# Patient Record
Sex: FEMALE | URBAN - METROPOLITAN AREA
[De-identification: names, ages, dates, MRNs, and addresses within clinical notes are randomized per-mention and may not be internally consistent; named-entity substitution may affect disease eponyms.]

---

## 2021-11-15 ENCOUNTER — OFFICE VISIT (OUTPATIENT)
Dept: URBAN - METROPOLITAN AREA CLINIC 96 | Facility: CLINIC | Age: 61
End: 2021-11-15
Payer: COMMERCIAL

## 2021-11-15 ENCOUNTER — WEB ENCOUNTER (OUTPATIENT)
Dept: URBAN - METROPOLITAN AREA CLINIC 96 | Facility: CLINIC | Age: 61
End: 2021-11-15

## 2021-11-15 DIAGNOSIS — Z86.010 PERSONAL HISTORY OF COLONIC POLYPS: ICD-10-CM

## 2021-11-15 DIAGNOSIS — Z83.79 FAMILY HISTORY OF GALLBLADDER DISEASE: ICD-10-CM

## 2021-11-15 DIAGNOSIS — D12.6 TUBULAR ADENOMA OF COLON: ICD-10-CM

## 2021-11-15 DIAGNOSIS — Z79.1 NSAID LONG-TERM USE: ICD-10-CM

## 2021-11-15 DIAGNOSIS — R10.13 EPIGASTRIC ABDOMINAL PAIN: ICD-10-CM

## 2021-11-15 DIAGNOSIS — R14.0 BLOATING: ICD-10-CM

## 2021-11-15 DIAGNOSIS — E66.3 OVERWEIGHT (BMI 25.0-29.9): ICD-10-CM

## 2021-11-15 PROCEDURE — 99204 OFFICE O/P NEW MOD 45 MIN: CPT | Performed by: INTERNAL MEDICINE

## 2021-11-15 PROCEDURE — 99244 OFF/OP CNSLTJ NEW/EST MOD 40: CPT | Performed by: INTERNAL MEDICINE

## 2021-11-15 RX ORDER — POLYETHYLENE GLYCOL 3350, SODIUM SULFATE, SODIUM CHLORIDE, POTASSIUM CHLORIDE, ASCORBIC ACID, SODIUM ASCORBATE 140-9-5.2G
AS DIRECTED KIT ORAL
Qty: 1 BOX | Refills: 1 | OUTPATIENT

## 2021-11-15 NOTE — HPI-TODAY'S VISIT:
The patient was referred by Dr. Bartolome Anand for abd pain.   A copy of this document is being forwarded to the referring provider. 61 y.o. female, lives w/ , 3 children, 10 grandchildren, at home on disability When made appt 6 weeks ago, had sharp pains below breast Couldn't pass gas or burp Very uncomfortable Bloated Used Debbie Whitewater and baking powder to relieve it Would last all day Couldn't eat anything  Not sure what happened Everything is better now Have been on pantoprazole for a while and was on it when had the syx

## 2021-11-17 LAB
A/G RATIO: 1.7
ALBUMIN: 4.5
ALKALINE PHOSPHATASE: 77
ALT (SGPT): 19
AST (SGOT): 18
BASO (ABSOLUTE): 0
BASOS: 1
BILIRUBIN, TOTAL: 0.4
BUN/CREATININE RATIO: 19
BUN: 18
CALCIUM: 10.2
CARBON DIOXIDE, TOTAL: 23
CHLORIDE: 100
CREATININE: 0.94
EGFR IF AFRICN AM: 77
EGFR IF NONAFRICN AM: 67
ENDOMYSIAL ANTIBODY IGA: NEGATIVE
EOS (ABSOLUTE): 0.1
EOS: 3
GLOBULIN, TOTAL: 2.6
GLUCOSE: 93
HEMATOCRIT: 35.9
HEMATOLOGY COMMENTS:: (no result)
HEMOGLOBIN: 11.8
IMMATURE CELLS: (no result)
IMMATURE GRANS (ABS): 0
IMMATURE GRANULOCYTES: 0
IMMUNOGLOBULIN A, QN, SERUM: 347
LIPASE: 40
LYMPHS (ABSOLUTE): 1.2
LYMPHS: 28
MCH: 28.3
MCHC: 32.9
MCV: 86
MONOCYTES(ABSOLUTE): 0.5
MONOCYTES: 11
NEUTROPHILS (ABSOLUTE): 2.5
NEUTROPHILS: 57
NRBC: (no result)
PLATELETS: 297
POTASSIUM: 4.2
PROTEIN, TOTAL: 7.1
RBC: 4.17
RDW: 13.1
SODIUM: 140
T-TRANSGLUTAMINASE (TTG) IGA: <2
WBC: 4.3

## 2021-11-22 ENCOUNTER — DASHBOARD ENCOUNTERS (OUTPATIENT)
Age: 61
End: 2021-11-22

## 2022-01-25 ENCOUNTER — OFFICE VISIT (OUTPATIENT)
Dept: URBAN - METROPOLITAN AREA SURGERY CENTER 18 | Facility: SURGERY CENTER | Age: 62
End: 2022-01-25

## 2022-03-14 PROBLEM — 428283002: Status: ACTIVE | Noted: 2021-11-15

## 2022-03-28 ENCOUNTER — CLAIMS CREATED FROM THE CLAIM WINDOW (OUTPATIENT)
Dept: URBAN - METROPOLITAN AREA CLINIC 4 | Facility: CLINIC | Age: 62
End: 2022-03-28
Payer: COMMERCIAL

## 2022-03-28 ENCOUNTER — OFFICE VISIT (OUTPATIENT)
Dept: URBAN - METROPOLITAN AREA SURGERY CENTER 18 | Facility: SURGERY CENTER | Age: 62
End: 2022-03-28
Payer: COMMERCIAL

## 2022-03-28 DIAGNOSIS — K63.5 BENIGN COLON POLYP: ICD-10-CM

## 2022-03-28 DIAGNOSIS — Z86.010 ADENOMAS PERSONAL HISTORY OF COLONIC POLYPS: ICD-10-CM

## 2022-03-28 DIAGNOSIS — K63.89 CYST OF DUODENUM: ICD-10-CM

## 2022-03-28 PROCEDURE — 88305 TISSUE EXAM BY PATHOLOGIST: CPT | Performed by: PATHOLOGY

## 2022-03-28 PROCEDURE — G8907 PT DOC NO EVENTS ON DISCHARG: HCPCS | Performed by: INTERNAL MEDICINE

## 2022-03-28 PROCEDURE — 45380 COLONOSCOPY AND BIOPSY: CPT | Performed by: INTERNAL MEDICINE

## 2022-03-28 RX ORDER — POLYETHYLENE GLYCOL 3350, SODIUM SULFATE, SODIUM CHLORIDE, POTASSIUM CHLORIDE, ASCORBIC ACID, SODIUM ASCORBATE 140-9-5.2G
AS DIRECTED KIT ORAL
Qty: 1 BOX | Refills: 1 | Status: ACTIVE | COMMUNITY